# Patient Record
Sex: FEMALE | Race: WHITE | NOT HISPANIC OR LATINO
[De-identification: names, ages, dates, MRNs, and addresses within clinical notes are randomized per-mention and may not be internally consistent; named-entity substitution may affect disease eponyms.]

---

## 2019-01-14 PROBLEM — Z00.00 ENCOUNTER FOR PREVENTIVE HEALTH EXAMINATION: Status: ACTIVE | Noted: 2019-01-14

## 2019-03-08 ENCOUNTER — RECORD ABSTRACTING (OUTPATIENT)
Age: 74
End: 2019-03-08

## 2019-03-08 DIAGNOSIS — Z78.9 OTHER SPECIFIED HEALTH STATUS: ICD-10-CM

## 2019-03-08 DIAGNOSIS — Z87.39 PERSONAL HISTORY OF OTHER DISEASES OF THE MUSCULOSKELETAL SYSTEM AND CONNECTIVE TISSUE: ICD-10-CM

## 2019-04-05 ENCOUNTER — RECORD ABSTRACTING (OUTPATIENT)
Age: 74
End: 2019-04-05

## 2019-04-18 ENCOUNTER — RESULT REVIEW (OUTPATIENT)
Age: 74
End: 2019-04-18

## 2019-04-18 DIAGNOSIS — I10 ESSENTIAL (PRIMARY) HYPERTENSION: ICD-10-CM

## 2019-04-18 DIAGNOSIS — E78.00 PURE HYPERCHOLESTEROLEMIA, UNSPECIFIED: ICD-10-CM

## 2019-04-24 PROBLEM — Z82.49 FAMILY HISTORY OF CARDIOVASCULAR DISEASE: Status: ACTIVE | Noted: 2019-04-24

## 2019-04-26 ENCOUNTER — NON-APPOINTMENT (OUTPATIENT)
Age: 74
End: 2019-04-26

## 2019-04-26 ENCOUNTER — APPOINTMENT (OUTPATIENT)
Dept: CARDIOLOGY | Facility: CLINIC | Age: 74
End: 2019-04-26
Payer: MEDICARE

## 2019-04-26 VITALS
BODY MASS INDEX: 34.3 KG/M2 | HEART RATE: 69 BPM | WEIGHT: 159 LBS | DIASTOLIC BLOOD PRESSURE: 80 MMHG | SYSTOLIC BLOOD PRESSURE: 122 MMHG | HEIGHT: 57 IN

## 2019-04-26 DIAGNOSIS — Z82.49 FAMILY HISTORY OF ISCHEMIC HEART DISEASE AND OTHER DISEASES OF THE CIRCULATORY SYSTEM: ICD-10-CM

## 2019-04-26 PROCEDURE — 93000 ELECTROCARDIOGRAM COMPLETE: CPT

## 2019-04-26 PROCEDURE — 99214 OFFICE O/P EST MOD 30 MIN: CPT

## 2019-04-26 NOTE — HISTORY OF PRESENT ILLNESS
[FreeTextEntry1] : This 73-year-old female patient presents for cardiovascular evaluation.\par \par Her problem list is as noted above.\par \par Since her last examination one year ago she reports no major events or hospitalizations. She continues to no chronic shortness of breath. She is considerably deconditioned. No acute symptoms of shortness of breath, chest discomfort, lightheadedness, palpitations.\par \par She did have an upper respiratory infection in December. She saw her primary care physician.\par \par She has had laboratories that she kindly provided.

## 2019-04-26 NOTE — ASSESSMENT
[FreeTextEntry1] : EKG 4/26/19. Sinus rhythm. Low-voltage limb leads and precordial leads. No acute changes.

## 2019-04-26 NOTE — ADDENDUM
[FreeTextEntry1] : Instructions to staff:\par \par Send a copy of this report to the following provider(s):\par Dr. Marietta Lala\par \par Schedule followup:\par 1 year\par \par Schedule testing:\par The patient will call in advance of her appointment to schedule an echocardiogram\par \par \par This report was generated using Dragon Dictation. Please excuse obvious typographical errors and contact this office for any questions. Any preliminary copy of this note given to the patient at the time of this visit has not been proofread or edited. This note is a part of a shared electronic record used by our physicians and may contain information generated by other physicians in this practice in addition to your visit with this office.

## 2019-04-26 NOTE — REASON FOR VISIT
[FreeTextEntry1] : Ms. CHARLI SCHULTZ presents for cardiovascular evaluation.\par \par Her problem list includes:\par Chronic pericardial effusion, status post previous pericardiocentesis\par Hypertension\par Dyslipidemia\par Obesity.\par \par Her primary care physician is Dr. Marietta Lala

## 2019-04-26 NOTE — DISCUSSION/SUMMARY
[FreeTextEntry1] : Problem list/impression/recommendation.\par \par Pericardial effusion.\par Status post previous pericardial window with continuing moderate to large effusion without hemodynamic compromise. Chronic finding.\par Continue to follow clinically.\par \par Hypertension\par Good control. Continue current routine.\par \par Dyslipidemia.\par She is tolerating her every other day Livalo. She was intolerant of all of the statins.\par Satisfactory findings.\par \par Obesity.\par I encouraged the patient regarding exercise and diet. She does struggle with this. It does limit her functional capacity.

## 2019-04-26 NOTE — PHYSICAL EXAM
[Eyelids - No Xanthelasma] : the eyelids demonstrated no xanthelasmas [Normal Conjunctiva] : the conjunctiva exhibited no abnormalities [No Oral Pallor] : no oral pallor [Normal Oral Mucosa] : normal oral mucosa [No Oral Cyanosis] : no oral cyanosis [Normal Jugular Venous A Waves Present] : normal jugular venous A waves present [Normal Jugular Venous V Waves Present] : normal jugular venous V waves present [No Jugular Venous Escalante A Waves] : no jugular venous escalante A waves [Exaggerated Use Of Accessory Muscles For Inspiration] : no accessory muscle use [Respiration, Rhythm And Depth] : normal respiratory rhythm and effort [Auscultation Breath Sounds / Voice Sounds] : lungs were clear to auscultation bilaterally [Heart Rate And Rhythm] : heart rate and rhythm were normal [Murmurs] : no murmurs present [Heart Sounds] : normal S1 and S2 [Abdomen Tenderness] : non-tender [Abdomen Soft] : soft [Abdomen Mass (___ Cm)] : no abdominal mass palpated [] : no rash [Skin Color & Pigmentation] : normal skin color and pigmentation [No Venous Stasis] : no venous stasis [Skin Lesions] : no skin lesions [No Skin Ulcers] : no skin ulcer [Affect] : the affect was normal [Oriented To Time, Place, And Person] : oriented to person, place, and time [No Xanthoma] : no  xanthoma was observed [Mood] : the mood was normal [No Anxiety] : not feeling anxious [FreeTextEntry1] : trace edema

## 2020-06-14 ENCOUNTER — RESULT REVIEW (OUTPATIENT)
Age: 75
End: 2020-06-14

## 2020-07-20 ENCOUNTER — APPOINTMENT (OUTPATIENT)
Dept: ENDOCRINOLOGY | Facility: CLINIC | Age: 75
End: 2020-07-20
Payer: MEDICARE

## 2020-07-20 VITALS
DIASTOLIC BLOOD PRESSURE: 80 MMHG | HEIGHT: 57.75 IN | WEIGHT: 165 LBS | OXYGEN SATURATION: 95 % | HEART RATE: 77 BPM | SYSTOLIC BLOOD PRESSURE: 126 MMHG | BODY MASS INDEX: 34.63 KG/M2

## 2020-07-20 PROCEDURE — 99205 OFFICE O/P NEW HI 60 MIN: CPT

## 2020-07-20 NOTE — HISTORY OF PRESENT ILLNESS
[FreeTextEntry1] : Ms. CHARLI SCHULTZ is 74 year old who presents for osteoporosis evaluation. \par she  was diagnosed of osteoporosis on the basis of    DXA scan 8-10 yrs ago \par spine t scores are invalid , radius ulne -2.6, , hip is -1.1 ( increase in hip score , wrist is stable ) \par no fractures, but c/o back pain h/o DJD, and right hip pain and occasional left hip pain on prolonged inactivity \par Mother had osteoporosis \par She under went menopause at the age of 52 yrs \par she received any treatment for osteoporosis - none \par  h/o malignancies  -no \par  h/o radiation treatment -no \par  h/o clots -no \par h/o long-term steroids ( > 3 mths ) -no \par h/o phenytoin use , seizures  -no \par h/o eating disorders -no \par h/o prolonged amenorrhea -no \par Calcium intake \par Dietary -yes\par supplemental - 1 tab a day \par Vit D intake - 1000 u daily \par h/o renal stones -no \par h/o reflux disease -no , mild \par h/o malabsorption -no \par h/o falls or balance issues -no \par daily exercise - gardening \par \par 
left upper arm

## 2020-07-23 ENCOUNTER — RESULT REVIEW (OUTPATIENT)
Age: 75
End: 2020-07-23

## 2020-08-18 LAB
25(OH)D3 SERPL-MCNC: 45.9 NG/ML
ALBUMIN SERPL ELPH-MCNC: 4.5 G/DL
ALP BLD-CCNC: 82 U/L
ANION GAP SERPL CALC-SCNC: 13 MMOL/L
BUN SERPL-MCNC: 14 MG/DL
CALCIUM SERPL-MCNC: 9.6 MG/DL
CALCIUM SERPL-MCNC: 9.6 MG/DL
CHLORIDE SERPL-SCNC: 102 MMOL/L
CO2 SERPL-SCNC: 27 MMOL/L
COLLAGEN CTX SERPL-MCNC: 550 PG/ML
CREAT SERPL-MCNC: 0.7 MG/DL
GLUCOSE SERPL-MCNC: 102 MG/DL
M PROTEIN SPEC IFE-MCNC: NORMAL
PARATHYROID HORMONE INTACT: 42 PG/ML
PHOSPHATE SERPL-MCNC: 3.8 MG/DL
POTASSIUM SERPL-SCNC: 4.3 MMOL/L
SODIUM SERPL-SCNC: 142 MMOL/L

## 2020-08-26 ENCOUNTER — APPOINTMENT (OUTPATIENT)
Dept: CARDIOLOGY | Facility: CLINIC | Age: 75
End: 2020-08-26
Payer: MEDICARE

## 2020-08-26 VITALS
DIASTOLIC BLOOD PRESSURE: 80 MMHG | SYSTOLIC BLOOD PRESSURE: 124 MMHG | BODY MASS INDEX: 34.3 KG/M2 | WEIGHT: 159 LBS | HEIGHT: 57 IN

## 2020-08-26 VITALS — HEART RATE: 84 BPM

## 2020-08-26 PROCEDURE — 99214 OFFICE O/P EST MOD 30 MIN: CPT

## 2020-08-26 PROCEDURE — 93000 ELECTROCARDIOGRAM COMPLETE: CPT

## 2020-08-26 NOTE — HISTORY OF PRESENT ILLNESS
[FreeTextEntry1] : This 74 year-old female patient presents for cardiovascular evaluation.\par \par Her problem list is as noted above.\par \par Since her last examination 1 year ago she reports no major events or hospitalizations.  She tries to stay active but it is a struggle for her as well as maintaining her weight.  I encouraged her.\par \par There have been no acute symptoms of shortness of breath, chest discomfort, palpitation, lightheadedness.  She has some mild exercise intolerance but can do all her normal household activities.\par \par She did have an echocardiogram and laboratories as noted.  The echocardiogram shows a persistent but chronic and stable significant pericardial effusion despite previous pericardial window drainage.

## 2020-08-26 NOTE — REASON FOR VISIT
[FreeTextEntry1] : Ms. CHARLI SCHULTZ has the following problem list:\par \par \par Chronic pericardial effusion, status post previous pericardiocentesis\par Hypertension\par Dyslipidemia\par Obesity.\par Prediabetes\par \par Her primary care physician is Dr. Israel Felix

## 2020-08-26 NOTE — ASSESSMENT
[FreeTextEntry1] : EKG 8/26/2020.  Sinus rhythm.  Low voltage limb leads and precordial leads.  No acute changes.\par EKG 4/26/19. Sinus rhythm. Low-voltage limb leads and precordial leads. No acute changes.

## 2020-08-28 NOTE — DISCUSSION/SUMMARY
[FreeTextEntry1] : Brief recommendations and follow-up: (see above for details)\par \par The patient's overall cardiovascular status is stable.\par She continues with a chronic significant persistent pericardial effusion without hemodynamic compromise.  We will follow this regularly.\par I advised her to speak with her primary care physician regarding her prediabetes and her weight.  She may be a candidate for metformin.\par Next routine cardiology visit 1 year.\par The patient is aware to call a month or 2 in advance of her next year's visit so we can order an echocardiogram and have the results. 80.3

## 2020-09-02 ENCOUNTER — APPOINTMENT (OUTPATIENT)
Dept: ENDOCRINOLOGY | Facility: CLINIC | Age: 75
End: 2020-09-02
Payer: MEDICARE

## 2020-09-02 VITALS
HEART RATE: 74 BPM | SYSTOLIC BLOOD PRESSURE: 124 MMHG | BODY MASS INDEX: 34.95 KG/M2 | HEIGHT: 57 IN | DIASTOLIC BLOOD PRESSURE: 70 MMHG | OXYGEN SATURATION: 96 % | WEIGHT: 162 LBS

## 2020-09-02 PROCEDURE — 99214 OFFICE O/P EST MOD 30 MIN: CPT

## 2020-09-02 NOTE — ASSESSMENT
[Bisphosphonate Therapy] : Risks and benefits of bisphosphonate therapy were  discussed with the patient including gastroesophageal irritation, osteonecrosis of the jaw, and atypical femur fractures, and acute phase reaction

## 2020-09-02 NOTE — HISTORY OF PRESENT ILLNESS
[FreeTextEntry1] : Ms. CHARLI SCHULTZ is 74 year old who presents for osteoporosis evaluation. \par she  was diagnosed of osteoporosis on the basis of    DXA scan 8-10 yrs ago \par spine t scores are invalid , radius ulne -2.6, , hip is -1.1 ( increase in hip score , wrist is stable ) \par no fractures, but c/o back pain h/o DJD, and right hip pain and occasional left hip pain on prolonged inactivity \par Mother had osteoporosis \par She under went menopause at the age of 50 yrs \par she received any treatment for osteoporosis - none \par  h/o malignancies  -no \par  h/o radiation treatment -no \par  h/o clots -no \par h/o long-term steroids ( > 3 mths ) -no \par h/o phenytoin use , seizures  -no \par h/o eating disorders -no \par h/o prolonged amenorrhea -no \par Calcium intake \par Dietary -yes\par supplemental - 1 tab a day \par Vit D intake - 1000 u daily \par h/o renal stones -no \par h/o reflux disease -no , mild \par h/o malabsorption -no \par h/o falls or balance issues -no \par daily exercise - gardening \par \par \par \par 09/02/2020- FOLLOW UP\par  CHARLI SCHULTZ is here to discuss test results , labs and further plan of care \par labs discussed in detail-  essentially unremarkable \par no acute medical issues in the interim\par dicussed the imgaing findings of the hip xray \par blood work etc \par on calcium and vt D \par

## 2021-08-25 ENCOUNTER — NON-APPOINTMENT (OUTPATIENT)
Age: 76
End: 2021-08-25

## 2021-08-26 ENCOUNTER — NON-APPOINTMENT (OUTPATIENT)
Age: 76
End: 2021-08-26

## 2021-08-26 ENCOUNTER — APPOINTMENT (OUTPATIENT)
Dept: CARDIOLOGY | Facility: CLINIC | Age: 76
End: 2021-08-26
Payer: MEDICARE

## 2021-08-26 VITALS
WEIGHT: 156 LBS | SYSTOLIC BLOOD PRESSURE: 130 MMHG | HEIGHT: 57 IN | BODY MASS INDEX: 33.66 KG/M2 | HEART RATE: 67 BPM | TEMPERATURE: 97.8 F | DIASTOLIC BLOOD PRESSURE: 70 MMHG

## 2021-08-26 PROCEDURE — 93000 ELECTROCARDIOGRAM COMPLETE: CPT

## 2021-08-26 PROCEDURE — 99214 OFFICE O/P EST MOD 30 MIN: CPT

## 2021-08-26 NOTE — CARDIOLOGY SUMMARY
[de-identified] : Lexiscan MIBI 2/27/15. Normal. EF 70%. \par Stress test 1/23/2014. No ischemia. 6 minutes. Wiliam stage II. 7.2 METs. 96% predicted   maximum.\par  [de-identified] : Echo 8/5/2021.  A.  F.  Normal LV function.  EF 70%.  Large anterior and posterior pericardial effusion.  No hemodynamic compromise no change from 2020 study.\par Echo 6/16/2020. Normal LV function. EF 70%. Mild diastolic dysfunction. Normal valve  morphology. Trace TR. PA 18-23. Moderate to large anterior (up to 3.1 cm)   and posterior (up to 2.6 cm) chronic pericardial effusion. No hemodynamic compromise.    Mildly dilated proximal and ascending aorta, 4.0 cm. Similar to previous, possible mild   increase in pericardial effusion versus technical differences.\par  Echo 4/12/19. Normal LV function. EF 70%. Normal diastolic function. Normal valve  morphology. Moderate-large anterior, up to 2.5 cm, and posterior, up to 2.7 cm,  pericardial effusion (chronic finding). No hemodynamic compromise. Mildly dilated  proximal ascending aorta, 3.9 cm. Trace TR. Normal PA, 13-18.\par Echo 4/11/18. Normal LV function. EF 70%. Normal valve morphology. Trivial MR, TR.  Normal PA, and 19-24. Moderate-large anterior (2-2.8) and posterior (2-2.6) pericardial effusion. No compromise.\par

## 2021-08-26 NOTE — REVIEW OF SYSTEMS
[Negative] : Heme/Lymph [FreeTextEntry5] : See HPI. [FreeTextEntry7] : Improved GERD. [FreeTextEntry8] : Nocturia x1. [FreeTextEntry9] : Multijoint arthritis and chronic back syndrome.

## 2021-08-26 NOTE — ASSESSMENT
[FreeTextEntry1] : EKG 8/26/2021.  Sinus rhythm.  Low voltage limb and precordial leads.  No acute changes.\par EKG 8/26/2020.  Sinus rhythm.  Low voltage limb leads and precordial leads.  No acute changes.\par EKG 4/26/19. Sinus rhythm. Low-voltage limb leads and precordial leads. No acute changes.

## 2021-08-26 NOTE — HISTORY OF PRESENT ILLNESS
[FreeTextEntry1] : This 75 year-old female patient presents for cardiovascular evaluation.\par \par Her problem list is as noted above.\par \par Since her last examination 1 year ago she reports no major events or hospitalizations.  She is active at home and in the garden.  There have been no acute symptoms of shortness of breath, chest discomfort, palpitation, lightheadedness, fainting.  She continues to note some mild exercise intolerance with inclines that has been stable for many years.  No change.\par \par She has seen her primary care physician and had laboratories that were kindly provided.  She had her echocardiogram with the report as noted below.

## 2021-08-26 NOTE — DISCUSSION/SUMMARY
[FreeTextEntry1] : Brief recommendations and follow-up: (see above for details)\par \par The patient's overall cardiovascular status is well compensated.\par Stable large anterior and posterior pericardial effusion with prior history of pericardial window.\par Continue current medical regimen for hypertension, hyperlipidemia.\par Next routine cardiology visit 1 year.

## 2022-08-05 ENCOUNTER — NON-APPOINTMENT (OUTPATIENT)
Age: 77
End: 2022-08-05

## 2022-08-08 ENCOUNTER — APPOINTMENT (OUTPATIENT)
Dept: CARDIOLOGY | Facility: CLINIC | Age: 77
End: 2022-08-08

## 2022-08-08 PROCEDURE — 93306 TTE W/DOPPLER COMPLETE: CPT

## 2022-08-30 ENCOUNTER — NON-APPOINTMENT (OUTPATIENT)
Age: 77
End: 2022-08-30

## 2022-08-31 ENCOUNTER — APPOINTMENT (OUTPATIENT)
Dept: CARDIOLOGY | Facility: CLINIC | Age: 77
End: 2022-08-31

## 2022-08-31 ENCOUNTER — NON-APPOINTMENT (OUTPATIENT)
Age: 77
End: 2022-08-31

## 2022-08-31 VITALS
TEMPERATURE: 98.5 F | WEIGHT: 155 LBS | HEIGHT: 57 IN | BODY MASS INDEX: 33.44 KG/M2 | SYSTOLIC BLOOD PRESSURE: 114 MMHG | OXYGEN SATURATION: 99 % | HEART RATE: 77 BPM | DIASTOLIC BLOOD PRESSURE: 64 MMHG | RESPIRATION RATE: 14 BRPM

## 2022-08-31 PROCEDURE — 93000 ELECTROCARDIOGRAM COMPLETE: CPT

## 2022-08-31 PROCEDURE — 99214 OFFICE O/P EST MOD 30 MIN: CPT

## 2022-08-31 RX ORDER — LOSARTAN POTASSIUM 50 MG/1
50 TABLET, FILM COATED ORAL DAILY
Qty: 90 | Refills: 3 | Status: ACTIVE | COMMUNITY
Start: 2022-08-31

## 2022-08-31 RX ORDER — LEVOTHYROXINE SODIUM 0.03 MG/1
25 TABLET ORAL DAILY
Refills: 0 | Status: ACTIVE | COMMUNITY
Start: 2022-08-31

## 2022-08-31 RX ORDER — OMEGA-3 FATTY ACIDS/FISH OIL 360-1200MG
1200 CAPSULE ORAL
Refills: 0 | Status: ACTIVE | COMMUNITY
Start: 2022-08-31

## 2022-08-31 RX ORDER — CHOLECALCIFEROL (VITAMIN D3) 25 MCG
25 MCG TABLET ORAL
Refills: 0 | Status: ACTIVE | COMMUNITY

## 2022-08-31 RX ORDER — QUINAPRIL AND HYDROCHLOROTHIAZIDE 12.5; 2 MG/1; MG/1
20-12.5 TABLET ORAL DAILY
Qty: 90 | Refills: 3 | Status: DISCONTINUED | COMMUNITY
End: 2022-08-31

## 2022-08-31 NOTE — DISCUSSION/SUMMARY
[FreeTextEntry1] : Brief recommendations and follow-up: (see above for details)\par \par The patient's overall cardiovascular status is stable.\par As noted, large, chronic anterior and posterior pericardial effusion without hemodynamic compromise.  This has existed despite prior pericardial window.  She is clinically stable.\par The patient will inquire regarding potential metformin therapy with her primary care physician.\par Medications reviewed and reconciled.\par Next routine cardiology visit 1 year. yes

## 2022-08-31 NOTE — HISTORY OF PRESENT ILLNESS
[FreeTextEntry1] : This 77 year-old female patient presents for cardiovascular evaluation.\par \par Her problem list is as noted above.\par \par Since her last examination 1 year ago she reports no major events or hospitalizations.  She tries to stay active particularly in the garden but she does have significant orthopedic limitations.\par \par She had an echocardiogram as planned which continues to show a significant anterior and posterior pericardial effusion.  This has been chronic despite prior drainage.  No hemodynamic compromise.\par \par She has had follow-up with her primary care physician and had laboratories that she kindly provided.\par \par There have been no acute symptoms of chest discomfort, shortness of breath, palpitation, lightheadedness, fainting.

## 2022-08-31 NOTE — ASSESSMENT
[FreeTextEntry1] : EKG 8/31/2022.  Sinus rhythm.  VPC.  Low voltage limb and precordial leads.\par EKG 8/26/2021.  Sinus rhythm.  Low voltage limb and precordial leads.  No acute changes.\par EKG 8/26/2020.  Sinus rhythm.  Low voltage limb leads and precordial leads.  No acute changes.\par EKG 4/26/19. Sinus rhythm. Low-voltage limb leads and precordial leads. No acute changes.

## 2022-08-31 NOTE — REVIEW OF SYSTEMS
[FreeTextEntry5] : See HPI. [FreeTextEntry7] : Improved GERD. [FreeTextEntry8] : Nocturia x1. [FreeTextEntry9] : Multijoint arthritis and chronic back syndrome.

## 2022-08-31 NOTE — CARDIOLOGY SUMMARY
[de-identified] : Lexiscan MIBI 2/27/15. Normal. EF 70%. \par Stress test 1/23/2014. No ischemia. 6 minutes. Wiliam stage II. 7.2 METs. 96% predicted   maximum.\par  [de-identified] : Echo 8/8/22.  Normal LV function.  EF 70%.  Mild diastolic dysfunction.  Normal valve morphology and Doppler exam.  Large anterior (2.8 cm) and posterior (2.0 cm) pericardial effusion (known, chronic finding).  No evidence of hemodynamic compromise.  Normal estimated PA pressure, 16 mmHg.  Dilated ascending aorta, 4.0 cm.  Compared with 8/5/2021, no significant change.\par \par Echo 8/5/2021.  A.  F.  Normal LV function.  EF 70%.  Large anterior and posterior pericardial effusion.  No hemodynamic compromise no change from 2020 study.\par Echo 6/16/2020. Normal LV function. EF 70%. Mild diastolic dysfunction. Normal valve  morphology. Trace TR. PA 18-23. Moderate to large anterior (up to 3.1 cm)   and posterior (up to 2.6 cm) chronic pericardial effusion. No hemodynamic compromise.    Mildly dilated proximal and ascending aorta, 4.0 cm. Similar to previous, possible mild   increase in pericardial effusion versus technical differences.\par  Echo 4/12/19. Normal LV function. EF 70%. Normal diastolic function. Normal valve  morphology. Moderate-large anterior, up to 2.5 cm, and posterior, up to 2.7 cm,  pericardial effusion (chronic finding). No hemodynamic compromise. Mildly dilated  proximal ascending aorta, 3.9 cm. Trace TR. Normal PA, 13-18.\par Echo 4/11/18. Normal LV function. EF 70%. Normal valve morphology. Trivial MR, TR.  Normal PA, and 19-24. Moderate-large anterior (2-2.8) and posterior (2-2.6) pericardial effusion. No compromise.\par

## 2023-04-18 PROBLEM — Z00.00 ENCOUNTER FOR PREVENTIVE HEALTH EXAMINATION: Noted: 2023-04-18

## 2023-04-26 ENCOUNTER — APPOINTMENT (OUTPATIENT)
Dept: ORTHOPEDIC SURGERY | Facility: CLINIC | Age: 78
End: 2023-04-26

## 2023-07-11 ENCOUNTER — NON-APPOINTMENT (OUTPATIENT)
Age: 78
End: 2023-07-11

## 2023-07-11 ENCOUNTER — APPOINTMENT (OUTPATIENT)
Dept: ENDOCRINOLOGY | Facility: CLINIC | Age: 78
End: 2023-07-11
Payer: MEDICARE

## 2023-07-11 VITALS
BODY MASS INDEX: 32.36 KG/M2 | SYSTOLIC BLOOD PRESSURE: 128 MMHG | WEIGHT: 150 LBS | HEART RATE: 72 BPM | OXYGEN SATURATION: 99 % | DIASTOLIC BLOOD PRESSURE: 80 MMHG | HEIGHT: 57 IN

## 2023-07-11 DIAGNOSIS — M25.569 PAIN IN UNSPECIFIED KNEE: ICD-10-CM

## 2023-07-11 PROCEDURE — 99215 OFFICE O/P EST HI 40 MIN: CPT

## 2023-07-11 RX ORDER — ASPIRIN 81 MG
81 TABLET, DELAYED RELEASE (ENTERIC COATED) ORAL
Refills: 0 | Status: ACTIVE | COMMUNITY

## 2023-07-11 NOTE — HISTORY OF PRESENT ILLNESS
[FreeTextEntry1] : Ms. CHARLI SCHULTZ is 74 year old who presents for osteoporosis evaluation. \par she  was diagnosed of osteoporosis on the basis of    DXA scan 8-10 yrs ago \par spine t scores are invalid , radius ulne -2.6, , hip is -1.1 ( increase in hip score , wrist is stable ) \par no fractures, but c/o back pain h/o DJD, and right hip pain and occasional left hip pain on prolonged inactivity \par Mother had osteoporosis \par She under went menopause at the age of 50 yrs \par she received any treatment for osteoporosis - none \par  h/o malignancies  -no \par  h/o radiation treatment -no \par  h/o clots -no \par h/o long-term steroids ( > 3 mths ) -no \par h/o phenytoin use , seizures  -no \par h/o eating disorders -no \par h/o prolonged amenorrhea -no \par Calcium intake \par Dietary -yes\par supplemental - 1 tab a day \par Vit D intake - 1000 u daily \par h/o renal stones -no \par h/o reflux disease -no , mild \par h/o malabsorption -no \par h/o falls or balance issues -no \par daily exercise - gardening \par \par \par \par 09/02/2020- FOLLOW UP\par  CHARLI SCHULTZ is here to discuss test results , labs and further plan of care \par labs discussed in detail-  essentially unremarkable \par no acute medical issues in the interim\par dicussed the imgaing findings of the hip xray \par blood work etc \par on calcium and vt D \par \par \par 07/11/2023- FOLLOW UP\par Patient presents for follow-up after almost 3 years.  She was diagnosed with stress fractures in any history of multiple steroid shots for knee pain and osteoarthritis.  She was also diagnosed with osteoporosis per DEXA scan on the most recent bone density.  She did not she is to take alendronate as advised on prior visit.  She takes 1000 international units of vitamin D.  Stress fractures likely related to osteoporosis worsened with steroid use.  At this point I would refer her to pain management as it is causing significant issues with her quality of life.  In addition.  I will start the work-up for osteoporosis treatment.  I would prefer her to be treated with Forteo as it might help with fracture healing as well.  Important to rule out any evidence of primary hyperparathyroidism or any calcium metabolic disorder.  No history of cancer treatment or radiation treatment in the past.  All questions and concerns addressed.  Imaging reviewed.  Follow-up in 4 to 6 months.  Advised to start 1 Viactiv pill a day

## 2023-07-11 NOTE — ASSESSMENT
[0] : 2) Feeling down, depressed, or hopeless: Not at all (0) [PHQ-2 Negative - No further assessment needed] : PHQ-2 Negative - No further assessment needed [Teriparatide/Abaloparatide Therapy] : Risks and benefits of Teriparatide/Abaloparatide therapy were discussed with the patient including potential risk of osteosarcoma [[id number]] : 0

## 2023-07-19 LAB
24R-OH-CALCIDIOL SERPL-MCNC: 65.7 PG/ML
25(OH)D3 SERPL-MCNC: 40.8 NG/ML
ALBUMIN SERPL ELPH-MCNC: 4.5 G/DL
ANION GAP SERPL CALC-SCNC: 13 MMOL/L
BUN SERPL-MCNC: 14 MG/DL
CALCIUM ?TM UR-MCNC: 6.7 MG/DL
CALCIUM SERPL-MCNC: 9.8 MG/DL
CALCIUM SERPL-MCNC: 9.8 MG/DL
CALCIUM/CREAT UR: 0.2 RATIO
CHLORIDE SERPL-SCNC: 104 MMOL/L
CO2 SERPL-SCNC: 26 MMOL/L
CREAT SERPL-MCNC: 0.61 MG/DL
CREAT SPEC-SCNC: 31 MG/DL
EGFR: 93 ML/MIN/1.73M2
GLUCOSE SERPL-MCNC: 111 MG/DL
M PROTEIN SPEC IFE-MCNC: NORMAL
PARATHYROID HORMONE INTACT: 40 PG/ML
PHOSPHATE SERPL-MCNC: 3.2 MG/DL
POTASSIUM SERPL-SCNC: 4.7 MMOL/L
SODIUM SERPL-SCNC: 144 MMOL/L
TSH SERPL-ACNC: 3.03 UIU/ML

## 2023-07-21 LAB
ALP BONE SERPL-MCNC: 20.6 UG/L
COLLAGEN CTX SERPL-MCNC: 464 PG/ML

## 2023-08-18 ENCOUNTER — APPOINTMENT (OUTPATIENT)
Dept: CARDIOLOGY | Facility: CLINIC | Age: 78
End: 2023-08-18
Payer: MEDICARE

## 2023-08-18 PROCEDURE — 93306 TTE W/DOPPLER COMPLETE: CPT

## 2023-09-25 ENCOUNTER — NON-APPOINTMENT (OUTPATIENT)
Age: 78
End: 2023-09-25

## 2023-09-26 ENCOUNTER — NON-APPOINTMENT (OUTPATIENT)
Age: 78
End: 2023-09-26

## 2023-09-26 ENCOUNTER — APPOINTMENT (OUTPATIENT)
Dept: CARDIOLOGY | Facility: CLINIC | Age: 78
End: 2023-09-26
Payer: MEDICARE

## 2023-09-26 VITALS
DIASTOLIC BLOOD PRESSURE: 68 MMHG | BODY MASS INDEX: 32.36 KG/M2 | HEIGHT: 57 IN | SYSTOLIC BLOOD PRESSURE: 126 MMHG | HEART RATE: 67 BPM | WEIGHT: 150 LBS

## 2023-09-26 VITALS
BODY MASS INDEX: 32.36 KG/M2 | HEIGHT: 57 IN | WEIGHT: 150 LBS | SYSTOLIC BLOOD PRESSURE: 126 MMHG | HEART RATE: 72 BPM | DIASTOLIC BLOOD PRESSURE: 68 MMHG | OXYGEN SATURATION: 97 %

## 2023-09-26 DIAGNOSIS — I31.39 OTHER PERICARDIAL EFFUSION (NONINFLAMMATORY): ICD-10-CM

## 2023-09-26 DIAGNOSIS — E78.5 HYPERLIPIDEMIA, UNSPECIFIED: ICD-10-CM

## 2023-09-26 DIAGNOSIS — E66.9 OBESITY, UNSPECIFIED: ICD-10-CM

## 2023-09-26 DIAGNOSIS — R73.03 PREDIABETES.: ICD-10-CM

## 2023-09-26 DIAGNOSIS — Z92.89 PERSONAL HISTORY OF OTHER MEDICAL TREATMENT: ICD-10-CM

## 2023-09-26 DIAGNOSIS — I77.810 THORACIC AORTIC ECTASIA: ICD-10-CM

## 2023-09-26 DIAGNOSIS — I10 ESSENTIAL (PRIMARY) HYPERTENSION: ICD-10-CM

## 2023-09-26 DIAGNOSIS — Z01.89 ENCOUNTER FOR OTHER SPECIFIED SPECIAL EXAMINATIONS: ICD-10-CM

## 2023-09-26 PROCEDURE — 93000 ELECTROCARDIOGRAM COMPLETE: CPT

## 2023-09-26 PROCEDURE — 99214 OFFICE O/P EST MOD 30 MIN: CPT | Mod: 25

## 2023-09-26 RX ORDER — HYDROCORTISONE ACETATE 0.5 %
CREAM (GRAM) TOPICAL
Refills: 0 | Status: DISCONTINUED | COMMUNITY
End: 2023-09-26

## 2023-10-10 ENCOUNTER — APPOINTMENT (OUTPATIENT)
Dept: PAIN MANAGEMENT | Facility: CLINIC | Age: 78
End: 2023-10-10

## 2023-10-19 ENCOUNTER — APPOINTMENT (OUTPATIENT)
Dept: PAIN MANAGEMENT | Facility: CLINIC | Age: 78
End: 2023-10-19
Payer: MEDICARE

## 2023-10-19 VITALS
HEIGHT: 57 IN | WEIGHT: 150 LBS | SYSTOLIC BLOOD PRESSURE: 184 MMHG | BODY MASS INDEX: 32.36 KG/M2 | DIASTOLIC BLOOD PRESSURE: 102 MMHG

## 2023-10-19 VITALS — DIASTOLIC BLOOD PRESSURE: 79 MMHG | SYSTOLIC BLOOD PRESSURE: 161 MMHG

## 2023-10-19 DIAGNOSIS — G89.4 CHRONIC PAIN SYNDROME: ICD-10-CM

## 2023-10-19 PROCEDURE — 99204 OFFICE O/P NEW MOD 45 MIN: CPT

## 2023-11-09 ENCOUNTER — APPOINTMENT (OUTPATIENT)
Dept: BREAST CENTER | Facility: CLINIC | Age: 78
End: 2023-11-09
Payer: MEDICARE

## 2023-11-09 ENCOUNTER — NON-APPOINTMENT (OUTPATIENT)
Age: 78
End: 2023-11-09

## 2023-11-09 VITALS
BODY MASS INDEX: 26.5 KG/M2 | OXYGEN SATURATION: 96 % | DIASTOLIC BLOOD PRESSURE: 77 MMHG | HEART RATE: 85 BPM | SYSTOLIC BLOOD PRESSURE: 128 MMHG | WEIGHT: 144 LBS | HEIGHT: 62 IN

## 2023-11-09 PROCEDURE — 99204 OFFICE O/P NEW MOD 45 MIN: CPT

## 2023-11-16 ENCOUNTER — RX RENEWAL (OUTPATIENT)
Age: 78
End: 2023-11-16

## 2023-11-28 ENCOUNTER — RESULT REVIEW (OUTPATIENT)
Age: 78
End: 2023-11-28

## 2023-11-29 ENCOUNTER — RESULT REVIEW (OUTPATIENT)
Age: 78
End: 2023-11-29

## 2023-11-30 ENCOUNTER — APPOINTMENT (OUTPATIENT)
Dept: BREAST CENTER | Facility: HOSPITAL | Age: 78
End: 2023-11-30

## 2023-11-30 ENCOUNTER — RESULT REVIEW (OUTPATIENT)
Age: 78
End: 2023-11-30

## 2023-12-05 ENCOUNTER — NON-APPOINTMENT (OUTPATIENT)
Age: 78
End: 2023-12-05

## 2023-12-11 ENCOUNTER — APPOINTMENT (OUTPATIENT)
Dept: BREAST CENTER | Facility: CLINIC | Age: 78
End: 2023-12-11
Payer: MEDICARE

## 2023-12-11 PROCEDURE — 99024 POSTOP FOLLOW-UP VISIT: CPT

## 2023-12-28 ENCOUNTER — NON-APPOINTMENT (OUTPATIENT)
Age: 78
End: 2023-12-28

## 2024-01-10 ENCOUNTER — APPOINTMENT (OUTPATIENT)
Dept: RADIATION ONCOLOGY | Facility: CLINIC | Age: 79
End: 2024-01-10
Payer: MEDICARE

## 2024-01-10 ENCOUNTER — RESULT REVIEW (OUTPATIENT)
Age: 79
End: 2024-01-10

## 2024-01-10 ENCOUNTER — APPOINTMENT (OUTPATIENT)
Dept: HEMATOLOGY ONCOLOGY | Facility: CLINIC | Age: 79
End: 2024-01-10
Payer: MEDICARE

## 2024-01-10 VITALS
WEIGHT: 152.5 LBS | RESPIRATION RATE: 17 BRPM | HEART RATE: 72 BPM | HEIGHT: 62 IN | BODY MASS INDEX: 28.06 KG/M2 | OXYGEN SATURATION: 97 % | DIASTOLIC BLOOD PRESSURE: 68 MMHG | TEMPERATURE: 96.1 F | SYSTOLIC BLOOD PRESSURE: 147 MMHG

## 2024-01-10 VITALS
OXYGEN SATURATION: 98 % | SYSTOLIC BLOOD PRESSURE: 174 MMHG | RESPIRATION RATE: 18 BRPM | DIASTOLIC BLOOD PRESSURE: 88 MMHG | HEART RATE: 66 BPM

## 2024-01-10 PROCEDURE — 36415 COLL VENOUS BLD VENIPUNCTURE: CPT

## 2024-01-10 PROCEDURE — 99205 OFFICE O/P NEW HI 60 MIN: CPT

## 2024-01-10 PROCEDURE — 99205 OFFICE O/P NEW HI 60 MIN: CPT | Mod: 25

## 2024-01-10 NOTE — PHYSICAL EXAM
[Fully active, able to carry on all pre-disease performance without restriction] : Status 0 - Fully active, able to carry on all pre-disease performance without restriction [Normal] : affect appropriate [de-identified] : L breast incision - c/d/i. No signs of infection. No masses palpated bilaterally, no axillary adenopathy

## 2024-01-10 NOTE — CONSULT LETTER
[Dear  ___] : Dear  [unfilled], [Consult Letter:] : I had the pleasure of evaluating your patient, [unfilled]. [Please see my note below.] : Please see my note below. [Consult Closing:] : Thank you very much for allowing me to participate in the care of this patient.  If you have any questions, please do not hesitate to contact me. [Sincerely,] : Sincerely, [FreeTextEntry3] : Yane Jimenez DO, FACLORAINE, FACP Medical Oncology and Hematology [facility]

## 2024-01-10 NOTE — HISTORY OF PRESENT ILLNESS
[de-identified] : Ms. Frost is a 78-year-old postmenopausal that presents for initial consultation referred by Dr. Khan for newly diagnosed breast cancer.   Oncological History:   Had benign breast bx at age 57 or R breast   s/p screening mammogram which showed in the left outer central breast and focal asymmetry and distortion.   Follow-up left ultrasound showed at the 3 o'clock position of 1.1 cm mass with normal-appearing lymph nodes.   s/p left ultrasound-guided core biopsy of the mass which was at 3:00 retroareolar region and showed a moderately differentiated invasive ductal carcinoma that is ER/DC positive HER2 negative with a Ki-67 of 40 to 50%.    left partial mastectomy with sentinel node biopsy Moderately differentiated invasive ductal carcinoma, 12 mm, ER/DC positive, HER2 negative, Ki-67 41%, 0/2 nodes [id number]  Mammaprint low risk   Breast Cancer Risk Factors: Menarche: 14 Date of LMP: 57 Menopause: 57  Age at first live birth: 25 Nursed: yes Hysterectomy: no Oophorectomy: no OCP: no HRT: no Last pap/pelvic exam: has not seen GYN in many years Related family history no cancer hx  Ashkenazi: no BRCA testing:no  Cardio Dr. Cosme s/p pericardiocentesis  Endo Dr. [facility] reclast

## 2024-01-10 NOTE — ASSESSMENT
[FreeTextEntry1] : # Moderately differentiated invasive ductal carcinoma, 12 mm, ER/SC positive, HER2 negative, Ki-67 41%, 0/2 nodes [id number] s/p left partial mastectomy with sentinel node biopsy Mammaprint low risk  Reviewed the diagnosis, prognosis and natural history of ER/SC+, HER2- IDC Reviewed the imaging and path Reviewed the NCCN guidelines and patient expresses understanding Due to low risk mammaprint does not need chemo She will discuss with Dr. Deshpande the role of RT after our consultation She has osteoporosis of the forearm -3.2 and significantKnee pain that is affecting her quality of life I discussed AI however in her case she may benefit from Tamoxifen instead. We reviewed the side effects of tamoxifen to include but are not limited to increased risk of uterine cancer, increased risk of VTE, hot flashes, decreased labido, vaginal dryness, mood swings Will need repeat DEXA 8/24.  #osteoporosis given reclast 9/2023 continue ca++ and vit D continue weight bearing exercise limit alcohol maintain healthy BMI  RTC in 3 months with cbc with diff, cmp, vit D

## 2024-01-10 NOTE — VITALS
[Maximal Pain Intensity: 0/10] : 0/10 [Least Pain Intensity: 0/10] : 0/10 [100: Normal, no complaints, no evidence of disease.] : 100: Normal, no complaints, no evidence of disease. [ECOG Performance Status: 0 - Fully active, able to carry on all pre-disease performance without restriction] : Performance Status: 0 - Fully active, able to carry on all pre-disease performance without restriction [Date: ____________] : Patient's last distress assessment performed on [unfilled]. [3 - Distress Level] : Distress Level: 3

## 2024-01-15 NOTE — DISEASE MANAGEMENT
[Clinical] : TNM Stage: c [IA] : IA [Pathological] : TNM Stage: p [FreeTextEntry4] : Invasive Ductal Carcinoma of the left breast, ER/MS+ [NTNM] : 0 [TTNM] : 1c [MTNM] : X

## 2024-01-15 NOTE — HISTORY OF PRESENT ILLNESS
[FreeTextEntry1] : Mrs. Garcia is a 78-year-old female, diagnosed with stage IA, [id number], ER/GA+ invasive ductal carcinoma of the left breast, s/p lumpectomy referred for a consultation to discuss adjuvant radiation therapy.   Mrs. Garcia underwent a screening mammogram (23) which demonstrated asymmetry and distortion in the left breast. Follow-up diagnostic mammogram and US (10/20/23) revealed highly suspicious mass in the left 3:00 retroareolar region. On 11/3/23, she underwent a left ultrasound-guided core biopsy of the mass and pathology revealed a moderately differentiated invasive ductal carcinoma measuring 11 mm, grade 2, ER/GA positive HER2 (equivocal) negative with a Ki-67 of 41 to 50%.   On 23, she underwent a left partial mastectomy and sentinel lymph node sampling performed by Dr. Khan. Surgical pathology revealed a 12 mm invasive carcinoma, grade 2, DCIS present - intermediate grade; all surgical margins were negative - closest was 1.25 mm to the medial and at least 1.5 mm to the lateral margins. 0 / 2 lymph nodes were positive. Biomarkers were ER/GA positive, HER2 equivocal - negative, and Ki-67 41%. Ms. Frost tolerated the procedure well.  MammaPrint low risk luminal - type A  Patient has no family history of cancer.  GYN HX; , menarche 15, menopause at 57. No HRT or BC use.   Ms. Frost is present today to discuss adjuvant radiation therapy.

## 2024-01-15 NOTE — LETTER CLOSING
[Consult Closing:] : Thank you for allowing me to participate in the care of this patient.  If you have any questions, please do not hesitate to contact me. [Sincerely yours,] : Sincerely yours, [FreeTextEntry3] : Haylie Deshpande MD

## 2024-01-24 ENCOUNTER — TRANSCRIPTION ENCOUNTER (OUTPATIENT)
Age: 79
End: 2024-01-24

## 2024-02-06 ENCOUNTER — NON-APPOINTMENT (OUTPATIENT)
Age: 79
End: 2024-02-06

## 2024-02-07 NOTE — HISTORY OF PRESENT ILLNESS
[FreeTextEntry1] : Ms. Hoyos is status post fraction 4 / 5 of radiation to the left breast. She reports she is doing well without any significant side effects. She has very mild erythema to the treated area. She denies any pruritus or discomfort. She denies any fatigue at this time. She is scheduled to complete treatment tomorrow, 2/7/24. She is tolerating treatment well and will follow-up in 1 month for her PTE.

## 2024-02-07 NOTE — DISEASE MANAGEMENT
[Pathological] : TNM Stage: p [IA] : IA [FreeTextEntry4] : Invasive Ductal Carcinoma of the left breast, ER/[place]+ [TTNM] : 1c [NTNM] : 0 [MTNM] : X [de-identified] : 5671 cGy [de-identified] : 2600 cGy [de-identified] : left breast

## 2024-03-05 RX ORDER — PITAVASTATIN CALCIUM 2 MG/1
2 TABLET ORAL
Qty: 90 | Refills: 3 | Status: ACTIVE | COMMUNITY
Start: 1900-01-01 | End: 1900-01-01

## 2024-03-22 ENCOUNTER — APPOINTMENT (OUTPATIENT)
Dept: RADIATION ONCOLOGY | Facility: CLINIC | Age: 79
End: 2024-03-22
Payer: MEDICARE

## 2024-03-22 DIAGNOSIS — Z17.0 MALIGNANT NEOPLASM OF CENTRAL PORTION OF LEFT FEMALE BREAST: ICD-10-CM

## 2024-03-22 DIAGNOSIS — C50.112 MALIGNANT NEOPLASM OF CENTRAL PORTION OF LEFT FEMALE BREAST: ICD-10-CM

## 2024-03-22 PROCEDURE — 99024 POSTOP FOLLOW-UP VISIT: CPT

## 2024-04-11 RX ORDER — TAMOXIFEN CITRATE 20 MG/1
20 TABLET, FILM COATED ORAL DAILY
Qty: 90 | Refills: 1 | Status: ACTIVE | COMMUNITY
Start: 2024-01-10 | End: 1900-01-01

## 2024-04-22 ENCOUNTER — APPOINTMENT (OUTPATIENT)
Dept: BREAST CENTER | Facility: CLINIC | Age: 79
End: 2024-04-22
Payer: MEDICARE

## 2024-04-22 ENCOUNTER — RESULT REVIEW (OUTPATIENT)
Age: 79
End: 2024-04-22

## 2024-04-22 ENCOUNTER — APPOINTMENT (OUTPATIENT)
Dept: HEMATOLOGY ONCOLOGY | Facility: CLINIC | Age: 79
End: 2024-04-22
Payer: MEDICARE

## 2024-04-22 VITALS
WEIGHT: 153 LBS | HEIGHT: 62 IN | TEMPERATURE: 96.9 F | OXYGEN SATURATION: 96 % | DIASTOLIC BLOOD PRESSURE: 78 MMHG | BODY MASS INDEX: 28.16 KG/M2 | RESPIRATION RATE: 16 BRPM | HEART RATE: 60 BPM | SYSTOLIC BLOOD PRESSURE: 144 MMHG

## 2024-04-22 VITALS
OXYGEN SATURATION: 97 % | WEIGHT: 156 LBS | SYSTOLIC BLOOD PRESSURE: 127 MMHG | HEIGHT: 62 IN | HEART RATE: 69 BPM | BODY MASS INDEX: 28.71 KG/M2 | DIASTOLIC BLOOD PRESSURE: 85 MMHG

## 2024-04-22 DIAGNOSIS — Z90.12 ACQUIRED ABSENCE OF LEFT BREAST AND NIPPLE: ICD-10-CM

## 2024-04-22 DIAGNOSIS — Z85.3 PERSONAL HISTORY OF MALIGNANT NEOPLASM OF BREAST: ICD-10-CM

## 2024-04-22 PROCEDURE — 99213 OFFICE O/P EST LOW 20 MIN: CPT

## 2024-04-22 PROCEDURE — 36415 COLL VENOUS BLD VENIPUNCTURE: CPT

## 2024-04-22 PROCEDURE — 99214 OFFICE O/P EST MOD 30 MIN: CPT

## 2024-04-22 NOTE — HISTORY OF PRESENT ILLNESS
[de-identified] : Ms. Frost is a 78-year-old postmenopausal that presents for initial consultation referred by Dr. Khan for newly diagnosed breast cancer.   Oncological History:   Had benign breast bx at age 57 or R breast   s/p screening mammogram which showed in the left outer central breast and focal asymmetry and distortion.   Follow-up left ultrasound showed at the 3 o'clock position of 1.1 cm mass with normal-appearing lymph nodes.   s/p left ultrasound-guided core biopsy of the mass which was at 3:00 retroareolar region and showed a moderately differentiated invasive ductal carcinoma that is ER/PA positive HER2 negative with a Ki-67 of 40 to 50%.    left partial mastectomy with sentinel node biopsy Moderately differentiated invasive ductal carcinoma, 12 mm, ER/PA positive, HER2 negative, Ki-67 41%, 0/2 nodes [id number]  Mammaprint low risk   Breast Cancer Risk Factors: Menarche: 14 Date of LMP: 57 Menopause: 57  Age at first live birth: 25 Nursed: yes Hysterectomy: no Oophorectomy: no OCP: no HRT: no Last pap/pelvic exam: has not seen GYN in many years Related family history no cancer hx  Ashkenazi: no BRCA testing:no  Cardio Dr. Cosme s/p pericardiocentesis  Endo Dr. Pretty [facility] reclast

## 2024-04-22 NOTE — ASSESSMENT
[FreeTextEntry1] : # Moderately differentiated invasive ductal carcinoma, 12 mm, ER/VT positive, HER2 negative, Ki-67 41%, 0/2 nodes [id number] s/p left partial mastectomy with sentinel node biopsy Mammaprint low risk  Due to low risk mammaprint does not need chemo s/p RT She has osteoporosis of the forearm -3.2 and significant Knee pain that is affecting her quality of life I discussed AI however in her case she may benefit from Tamoxifen instead. 4/22/24 - recommend continuing tamoxifen. vs and labs reviewed. cbc and cmet wnl. pending vit D. continue ca++ and vit D.  Will need repeat DEXA 8/24.  #osteoporosis given reclast 9/2023 continue ca++ and vit D continue weight bearing exercise limit alcohol maintain healthy BMI  RTC in 4-6 months with cbc with diff, cmp, vit D

## 2024-04-22 NOTE — CONSULT LETTER
[Dear  ___] : Dear  [unfilled], [Consult Letter:] : I had the pleasure of evaluating your patient, [unfilled]. [Please see my note below.] : Please see my note below. [Consult Closing:] : Thank you very much for allowing me to participate in the care of this patient.  If you have any questions, please do not hesitate to contact me. [Sincerely,] : Sincerely, [FreeTextEntry3] : Yane Jimenez DO, FACLORAINE, FACP Medical Oncology and Hematology [facility]

## 2024-04-22 NOTE — PHYSICAL EXAM
[Fully active, able to carry on all pre-disease performance without restriction] : Status 0 - Fully active, able to carry on all pre-disease performance without restriction [Normal] : affect appropriate [de-identified] : L breast incision - c/d/i. No signs of infection. No masses palpated bilaterally, no axillary adenopathy

## 2024-04-23 PROBLEM — Z90.12 STATUS POST PARTIAL MASTECTOMY OF LEFT BREAST: Status: ACTIVE | Noted: 2024-04-23

## 2024-04-23 PROBLEM — Z85.3 PERSONAL HISTORY OF BREAST CANCER: Status: ACTIVE | Noted: 2024-04-16

## 2024-04-23 NOTE — HISTORY OF PRESENT ILLNESS
[FreeTextEntry1] : 11/23 left partial mastectomy with sentinel node biopsy Moderately differentiated invasive ductal carcinoma, 12 mm, ER/[place] positive, HER2 negative, Ki-67 41%, 0/2 nodes [id number], Stage 1; low risk luminal MammaPrint: F LE X trial Whole breast radiation, tamoxifen  Patient denies any breast masses or bone pain.  78-year-old postmenopausal woman had a screening mammogram which showed in the left outer central breast and focal asymmetry and distortion.  Follow-up left ultrasound showed at the 3 o'clock position of 1.1 cm mass with normal-appearing lymph nodes.  Patient underwent a left ultrasound-guided core biopsy of the mass which was at 3:00 retroareolar region and showed a moderately differentiated invasive ductal carcinoma that is ER/WI positive HER2 negative with a Ki-67 of 40 to 50%.  Patient denies any breast masses or bone pain.  Patient has no family history of breast cancer and this is the patient's second biopsy the her first being at age 57 that was a benign right core biopsy.  Patient has never taken hormone replacement therapy.  Patient has a clinical stage Ia breast cancer that is T1 cN0 M0.

## 2024-04-23 NOTE — REASON FOR VISIT
[Follow-Up: _____] : a [unfilled] follow-up visit [FreeTextEntry1] : Left partial mastectomy and sentinel node biopsy

## 2024-05-21 ENCOUNTER — APPOINTMENT (OUTPATIENT)
Dept: ENDOCRINOLOGY | Facility: CLINIC | Age: 79
End: 2024-05-21
Payer: MEDICARE

## 2024-05-21 VITALS
OXYGEN SATURATION: 96 % | DIASTOLIC BLOOD PRESSURE: 60 MMHG | SYSTOLIC BLOOD PRESSURE: 122 MMHG | BODY MASS INDEX: 29.84 KG/M2 | HEART RATE: 78 BPM | HEIGHT: 60 IN | WEIGHT: 152 LBS

## 2024-05-21 DIAGNOSIS — M17.0 BILATERAL PRIMARY OSTEOARTHRITIS OF KNEE: ICD-10-CM

## 2024-05-21 DIAGNOSIS — M81.0 AGE-RELATED OSTEOPOROSIS W/OUT CURRENT PATHOLOGICAL FRACTURE: ICD-10-CM

## 2024-05-21 PROCEDURE — 99214 OFFICE O/P EST MOD 30 MIN: CPT

## 2024-05-21 PROCEDURE — G2211 COMPLEX E/M VISIT ADD ON: CPT

## 2024-05-21 RX ORDER — GABAPENTIN 300 MG/1
300 CAPSULE ORAL
Qty: 30 | Refills: 0 | Status: DISCONTINUED | COMMUNITY
Start: 2023-10-19 | End: 2024-05-21

## 2024-05-21 NOTE — HISTORY OF PRESENT ILLNESS
[FreeTextEntry1] : Osteoporosis diagnosed by DEXA scan 8-10 years ago Previous osteoporosis treatments: Reclast (x1; 9/2023) Calcium and Vitamin D through diet and supplementation: Physically active: Gardening. Last DEXA scan: August 2022; worst T-score of -3.2 in the left forearm, -1.0 in the femoral neck, -0.9 in the hip. Prior long-duration of glucocorticoid use: Frequent intraarticular glucocorticoid injections. History of fractures: Knee stress fractures Height loss: Yes, 2 inches approximately. Problems with balance or vision: Denies History of falls: Denies History of hyperparathyroidism: Denies History of hypercalcemia: Denies History of kidney stones: Denies History of malabsorption or eating disorder: Denies Prior anti-epileptic medications: Denies Prior chemotherapy or radiation therapy: Denies Caffeine, smoking, and alcohol history: Denies Prolonged amenorrhea or early menopause: Denies Family history of fractures, osteoporosis, or hyperparathyroidism: Mother, osteoporosis.  Heartburn or reflux symptoms: Denies ASCVD History: Denies Dentist following:  Yes, no extractions/implants planned.    09/02/2020- FOLLOW UP  CHARLI SCHULTZ is here to discuss test results , labs and further plan of care  labs discussed in detail- essentially unremarkable  no acute medical issues in the interim  dicussed the imgaing findings of the hip xray  blood work etc  on calcium and vt D      07/11/2023- FOLLOW UP  Patient presents for follow-up after almost 3 years. She was diagnosed with stress fractures in any history of multiple steroid shots for knee pain and osteoarthritis. She was also diagnosed with osteoporosis per DEXA scan on the most recent bone density. She did not she is to take alendronate as advised on prior visit. She takes 1000 international units of vitamin D. Stress fractures likely related to osteoporosis worsened with steroid use. At this point I would refer her to pain management as it is causing significant issues with her quality of life. In addition. I will start the work-up for osteoporosis treatment. I would prefer her to be treated with Forteo as it might help with fracture healing as well. Important to rule out any evidence of primary hyperparathyroidism or any calcium metabolic disorder. No history of cancer treatment or radiation treatment in the past. All questions and concerns addressed. Imaging reviewed. Follow-up in 4 to 6 months. Advised to start 1 Viactiv pill a day  05/21/2024- FOLLOW UP This is patient's first visit with me, Dr. Schaffer.  No fractures or falls in the interim.  She tolerated her first dose of Reclast in September 2023 with some flulike symptoms.  I discussed obtaining a new DEXA scan as a new baseline to gauge impact of therapy movements.  I recommend repeating thoracolumbar x-ray imaging to evaluate for compression fractures which would compel me to augment her treatment to anabolic therapy with either Evenity or Forteo/Tymlos.  Even Prolia would be better than Reclast for vertebral fractures.  If there are no fractures then recommend continuing annual Reclast infusions with annual DEXA scans to gauge improvement from therapy. Discussed possible adverse effects of osteoporosis therapy, including hypocalcemia, injection site reaction, musculoskeletal aches, osteonecrosis of the jaw and atypical femoral fractures. These complications do not occur often, rarely if at all. We will need routine labs drawn regularly just to ensure there is no hypocalcemia or vitamin D deficiency. I recommend regular dental evaluations to ensure that neither extractions nor implants are needed, so that jawbone health is maintained. We discussed that Reclast infusion may cause flu-type reaction and other injections may cause injection-site reaction. Evenity has a black box warning prohibiting use in patients who has cardiovascular or cerebrovascular events within last year. I discussed how the bone density gained from injection therapy can recede if injections are delayed, so it is important to not skip injections. We discussed reducing risk of AFF by limiting duration of consecutive treatments to less than 5-7 years. We may decide to initiate a drug holiday once annual DEXA surveillance has demonstrated resolution of osteoporosis. It would require continued annual surveillance of bone density to assess for bone loss. If discontinuing injection therapy, bisphosphonate therapy should be initiated within appropriate time to prevent rapid bone density loss. If she should ever experience hip or groin pain during treatment, she should tell me so I can investigate for AFF with hip x-rays. If she should endure a fragility fracture during treatment, we would have to consider an alternative therapy. Discussed supportive management by ingesting 1820-6712 mg elemental calcium and 2000 unit vitamin D daily through diet and supplementation. Encouraged weight-bearing exercises and avoidance of prolonged bedrest. Routine osteoporosis labs are needed every 6 months.  Patient will return to clinic in 6 months. All questions and concerns were fully addressed to patient's satisfaction. Patient is in agreement with stated plan.

## 2024-05-21 NOTE — REASON FOR VISIT
[Follow - Up] : a follow-up visit [Osteoporosis] : osteoporosis [Source: ______] : History obtained from BREANNE [Family Member] : family member [Ad Hoc ] : provided by an ad hoc  [Interpreters_FullName] : Wendi [Interpreters_Relationshiptopatient] : Daughter [FreeTextEntry2] : Dr. Wade [TWNoteComboBox1] : [place]

## 2024-05-21 NOTE — PHYSICAL EXAM
[Alert] : alert [Well Nourished] : well nourished [Healthy Appearance] : healthy appearance [No Acute Distress] : no acute distress [Well Developed] : well developed [Normal Voice/Communication] : normal voice communication [Normal Sclera/Conjunctiva] : normal sclera/conjunctiva [EOMI] : extra ocular movement intact [Normal Hearing] : hearing was normal [No Respiratory Distress] : no respiratory distress [Normal Rate and Effort] : normal respiratory rate and effort [Normal Rate] : heart rate was normal [No Spinal Tenderness] : no spinal tenderness [Spine Straight] : spine straight [Kyphosis] : no kyphosis present [No Stigmata of Cushings Syndrome] : no stigmata of Cushings Syndrome [Normal Gait] : normal gait [Normal Strength/Tone] : muscle strength and tone were normal [No Motor Deficits] : the motor exam was normal [No Tremors] : no tremors [Oriented x3] : oriented to person, place, and time [Normal Affect] : the affect was normal [Recent Memory Normal] : recent memory was not impaired [Normal Insight/Judgement] : insight and judgment were intact [Normal Mood] : the mood was normal [Remote Memory Normal] : remote memory was not impaired

## 2024-05-23 PROBLEM — C50.112 MALIGNANT NEOPLASM OF CENTRAL PORTION OF LEFT BREAST IN FEMALE, ESTROGEN RECEPTOR POSITIVE: Status: ACTIVE | Noted: 2023-11-09

## 2024-05-23 NOTE — HISTORY OF PRESENT ILLNESS
[FreeTextEntry1] : Mrs. Frost is a 78-year-old female, diagnosed with stage IA, [id number], ER/CA+ invasive ductal carcinoma of the left breast, s/p lumpectomy and status post adjuvant radiation therapy. She is present for her one month follow-up status post radiation.   Mrs. Frost underwent a screening mammogram (23) which demonstrated asymmetry and distortion in the left breast. Follow-up diagnostic mammogram and US (10/20/23) revealed highly suspicious mass in the left 3:00 retroareolar region. On 11/3/23, she underwent a left ultrasound-guided core biopsy of the mass and pathology revealed a moderately differentiated invasive ductal carcinoma measuring 11 mm, grade 2, ER/CA positive HER2 (equivocal) negative with a Ki-67 of 41 to 50%.   On 23, she underwent a left partial mastectomy and sentinel lymph node sampling performed by Dr. Khan. Surgical pathology revealed a 12 mm invasive carcinoma, grade 2, DCIS present - intermediate grade; all surgical margins were negative - closest was 1.25 mm to the medial and at least 1.5 mm to the lateral margins. 0 / 2 lymph nodes were positive. Biomarkers were ER/CA positive, HER2 equivocal - negative, and Ki-67 41%. Ms. Frost tolerated the procedure well.  MammaPrint low risk luminal - type A  Patient has no family history of cancer.  GYN HX; , menarche 15, menopause at 57. No HRT or BC use.   Ms. Frost completed adjuvant radiation therapy to the left breast to a total dose of 26 Gy in 5 fractions on 24. She had very mild erythema to the treated area, which has resolved. She will have follow-up by Dr. Jimenez and Dr. Khan on 3/22/24. She is taking Tamoxifen. Overall, she is doing well.

## 2024-05-23 NOTE — DISEASE MANAGEMENT
[Pathological] : TNM Stage: p [IA] : IA [FreeTextEntry4] : Invasive Ductal Carcinoma of the left breast, ER/NE+ [TTNM] : 1c [NTNM] : 0 [MTNM] : X

## 2024-08-14 ENCOUNTER — APPOINTMENT (OUTPATIENT)
Dept: CARDIOLOGY | Facility: CLINIC | Age: 79
End: 2024-08-14

## 2024-09-04 ENCOUNTER — APPOINTMENT (OUTPATIENT)
Dept: CARDIOLOGY | Facility: CLINIC | Age: 79
End: 2024-09-04

## 2024-09-04 PROCEDURE — 93306 TTE W/DOPPLER COMPLETE: CPT

## 2024-09-05 ENCOUNTER — RESULT REVIEW (OUTPATIENT)
Age: 79
End: 2024-09-05

## 2024-09-16 ENCOUNTER — APPOINTMENT (OUTPATIENT)
Dept: CARDIOLOGY | Facility: CLINIC | Age: 79
End: 2024-09-16

## 2024-09-23 ENCOUNTER — RESULT REVIEW (OUTPATIENT)
Age: 79
End: 2024-09-23

## 2024-09-23 ENCOUNTER — APPOINTMENT (OUTPATIENT)
Dept: BREAST CENTER | Facility: CLINIC | Age: 79
End: 2024-09-23
Payer: MEDICARE

## 2024-09-23 ENCOUNTER — APPOINTMENT (OUTPATIENT)
Dept: HEMATOLOGY ONCOLOGY | Facility: CLINIC | Age: 79
End: 2024-09-23
Payer: MEDICARE

## 2024-09-23 VITALS
HEART RATE: 70 BPM | SYSTOLIC BLOOD PRESSURE: 137 MMHG | WEIGHT: 155 LBS | BODY MASS INDEX: 30.43 KG/M2 | HEIGHT: 60 IN | TEMPERATURE: 97.9 F | RESPIRATION RATE: 16 BRPM | OXYGEN SATURATION: 99 % | DIASTOLIC BLOOD PRESSURE: 77 MMHG

## 2024-09-23 VITALS — BODY MASS INDEX: 31.02 KG/M2 | OXYGEN SATURATION: 96 % | HEART RATE: 76 BPM | WEIGHT: 158 LBS | HEIGHT: 60 IN

## 2024-09-23 DIAGNOSIS — R25.2 CRAMP AND SPASM: ICD-10-CM

## 2024-09-23 DIAGNOSIS — M81.0 AGE-RELATED OSTEOPOROSIS W/OUT CURRENT PATHOLOGICAL FRACTURE: ICD-10-CM

## 2024-09-23 DIAGNOSIS — R53.83 OTHER FATIGUE: ICD-10-CM

## 2024-09-23 DIAGNOSIS — Z90.12 ACQUIRED ABSENCE OF LEFT BREAST AND NIPPLE: ICD-10-CM

## 2024-09-23 DIAGNOSIS — M25.571 PAIN IN RIGHT ANKLE AND JOINTS OF RIGHT FOOT: ICD-10-CM

## 2024-09-23 DIAGNOSIS — Z17.0 MALIGNANT NEOPLASM OF CENTRAL PORTION OF LEFT FEMALE BREAST: ICD-10-CM

## 2024-09-23 DIAGNOSIS — Z85.3 PERSONAL HISTORY OF MALIGNANT NEOPLASM OF BREAST: ICD-10-CM

## 2024-09-23 DIAGNOSIS — C50.112 MALIGNANT NEOPLASM OF CENTRAL PORTION OF LEFT FEMALE BREAST: ICD-10-CM

## 2024-09-23 PROCEDURE — 99213 OFFICE O/P EST LOW 20 MIN: CPT

## 2024-09-23 PROCEDURE — 99214 OFFICE O/P EST MOD 30 MIN: CPT

## 2024-09-23 PROCEDURE — 36415 COLL VENOUS BLD VENIPUNCTURE: CPT

## 2024-09-23 RX ORDER — MULTIVITAMIN
TABLET ORAL
Refills: 0 | Status: ACTIVE | COMMUNITY

## 2024-09-23 NOTE — HISTORY OF PRESENT ILLNESS
[de-identified] : Ms. Frost is a 78-year-old postmenopausal that presents for initial consultation referred by Dr. Khan for newly diagnosed breast cancer.   Oncological History:   Had benign breast bx at age 57 or R breast   s/p screening mammogram which showed in the left outer central breast and focal asymmetry and distortion.   Follow-up left ultrasound showed at the 3 o'clock position of 1.1 cm mass with normal-appearing lymph nodes.   s/p left ultrasound-guided core biopsy of the mass which was at 3:00 retroareolar region and showed a moderately differentiated invasive ductal carcinoma that is ER/[place] positive HER2 negative with a Ki-67 of 40 to 50%.    left partial mastectomy with sentinel node biopsy Moderately differentiated invasive ductal carcinoma, 12 mm, ER/[place] positive, HER2 negative, Ki-67 41%, 0/2 nodes [id number]  Mammaprint low risk   Breast Cancer Risk Factors: Menarche: 14 Date of LMP: 57 Menopause: 57  Age at first live birth: 25 Nursed: yes Hysterectomy: no Oophorectomy: no OCP: no HRT: no Last pap/pelvic exam: has not seen GYN in many years Related family history no cancer hx  Ashkenazi: no BRCA testing:no  Cardio Dr. Cosme s/p pericardiocentesis  Endo Dr. Pretty [facility] reclast   [de-identified] : Pt here for follow up Overall feels well US/Mammo on 09/05/24- reviewed with Dr. Kelerman Pt complains of intermittent bilateral leg cramps for a while, 3-4x/day Swelling right ankle swelling since June, states it has not gotten any worse, currently 5-6/10 on pain scale. does take advil when needed

## 2024-09-23 NOTE — ASSESSMENT
[Reviewed updated] : Reviewed updated [Designated Health Care Proxy] : Designated Health Care Proxy [Name: ___] : Name: [unfilled] [Relationship: ___] : Relationship: [unfilled] [Full Code] : full code [FreeTextEntry1] : # Moderately differentiated invasive ductal carcinoma, 12 mm, ER/PA positive, HER2 negative, Ki-67 41%, 0/2 nodes [id number] s/p left partial mastectomy with sentinel node biopsy Mammaprint low risk  Due to low risk mammaprint does not need chemo s/p RT She has osteoporosis of the forearm -3.2 and significant Knee pain that is affecting her quality of life I discussed AI however in her case she may benefit from Tamoxifen instead. 4/22/24 - recommend continuing tamoxifen. vs and labs reviewed. cbc and cmet wnl. pending vit D. continue ca++ and vit D.  9/23/24 -  vs and labs reviewed. wbc, hgb and plts wnl. kidney function, liver function and electrolytes wnl. 9/5/24 - No mammographic or sonographic evidence of malignancy. repeat 9/25. remains on tamoxifen but having side effects including fatigue, leg cramps and R ankle swelling. recommend hiatus for ~4  weeks and if improvement she is considering stopping. if no improvement would recommend resuming. xray of ankle neg for fracture, US neg for DVT. repeat DEXA 9/24. - Left forearm -3.1, Femoral neck -1.9, total hip-0.5, continue reclast Reviewed Dr. Khan's note 9/23/24  #osteoporosis reclast due today continue ca++ and vit D continue weight bearing exercise limit alcohol maintain healthy BMI  repeat DEXA 9/24. - Left forearm -3.1, Femoral neck -1.9, total hip-0.5, follows with Dr. Wilde  #R ankle pain  #fatigue/ leg cramps add on irons and b12  RTC in 4-6 months with cbc with diff, cmp, vit D, iron, ferritin, b12 [AdvancecareDate] : 09/23/24

## 2024-09-23 NOTE — CONSULT LETTER
[Dear  ___] : Dear  [unfilled], [Consult Letter:] : I had the pleasure of evaluating your patient, [unfilled]. [Please see my note below.] : Please see my note below. [Consult Closing:] : Thank you very much for allowing me to participate in the care of this patient.  If you have any questions, please do not hesitate to contact me. [Sincerely,] : Sincerely, [FreeTextEntry3] : Yane Jimenez DO, FACLORAINE, FACP Medical Oncology and Hematology [facility]

## 2024-09-23 NOTE — HISTORY OF PRESENT ILLNESS
[de-identified] : Ms. Frost is a 78-year-old postmenopausal that presents for initial consultation referred by Dr. Khan for newly diagnosed breast cancer.   Oncological History:   Had benign breast bx at age 57 or R breast   s/p screening mammogram which showed in the left outer central breast and focal asymmetry and distortion.   Follow-up left ultrasound showed at the 3 o'clock position of 1.1 cm mass with normal-appearing lymph nodes.   s/p left ultrasound-guided core biopsy of the mass which was at 3:00 retroareolar region and showed a moderately differentiated invasive ductal carcinoma that is ER/OK positive HER2 negative with a Ki-67 of 40 to 50%.    left partial mastectomy with sentinel node biopsy Moderately differentiated invasive ductal carcinoma, 12 mm, ER/OK positive, HER2 negative, Ki-67 41%, 0/2 nodes [id number]  Mammaprint low risk   Breast Cancer Risk Factors: Menarche: 14 Date of LMP: 57 Menopause: 57  Age at first live birth: 25 Nursed: yes Hysterectomy: no Oophorectomy: no OCP: no HRT: no Last pap/pelvic exam: has not seen GYN in many years Related family history no cancer hx  Ashkenazi: no BRCA testing:no  Cardio Dr. Cosme s/p pericardiocentesis  Endo Dr. Pretty [facility] reclast   [de-identified] : Pt here for follow up Overall feels well US/Mammo on 09/05/24- reviewed with Dr. Kelerman Pt complains of intermittent bilateral leg cramps for a while, 3-4x/day Swelling right ankle swelling since June, states it has not gotten any worse, currently 5-6/10 on pain scale. does take advil when needed

## 2024-09-23 NOTE — REVIEW OF SYSTEMS
[Negative] : Allergic/Immunologic [Fatigue] : fatigue [Lower Ext Edema] : lower extremity edema [Joint Pain] : joint pain [Hot Flashes] : hot flashes [FreeTextEntry5] : right ankle swelling [FreeTextEntry7] : diarrhea last week but has since resolved [de-identified] : hot flashes almost daily

## 2024-09-23 NOTE — CONSULT LETTER
[Dear  ___] : Dear  [unfilled], [Consult Letter:] : I had the pleasure of evaluating your patient, [unfilled]. [Please see my note below.] : Please see my note below. [Consult Closing:] : Thank you very much for allowing me to participate in the care of this patient.  If you have any questions, please do not hesitate to contact me. [Sincerely,] : Sincerely, [FreeTextEntry3] : Yane Jimenez DO, FACLORAINE, FACP Medical Oncology and Hematology [facility]

## 2024-09-23 NOTE — BEGINNING OF VISIT
[0] : 2) Feeling down, depressed, or hopeless: Not at all (0) [PHQ-2 Negative] : PHQ-2 Negative [Pain Scale: ___] : On a scale of 1-10, today the patient's pain is a(n) [unfilled]. [Never] : Never [Date Discussed (MM/DD/YY): ___] : Discussed: [unfilled] [Reviewed updated] : Reviewed updated [[id number]] : 0 [Future Reassessment of Pain Scale] : Future reassessment of pain scale

## 2024-09-23 NOTE — ASSESSMENT
[Reviewed updated] : Reviewed updated [Designated Health Care Proxy] : Designated Health Care Proxy [Name: ___] : Name: [unfilled] [Relationship: ___] : Relationship: [unfilled] [Full Code] : full code [FreeTextEntry1] : # Moderately differentiated invasive ductal carcinoma, 12 mm, ER/[place] positive, HER2 negative, Ki-67 41%, 0/2 nodes [id number] s/p left partial mastectomy with sentinel node biopsy Mammaprint low risk  Due to low risk mammaprint does not need chemo s/p RT She has osteoporosis of the forearm -3.2 and significant Knee pain that is affecting her quality of life I discussed AI however in her case she may benefit from Tamoxifen instead. 4/22/24 - recommend continuing tamoxifen. vs and labs reviewed. cbc and cmet wnl. pending vit D. continue ca++ and vit D.  9/23/24 -  vs and labs reviewed. wbc, hgb and plts wnl. kidney function, liver function and electrolytes wnl. 9/5/24 - No mammographic or sonographic evidence of malignancy. repeat 9/25. remains on tamoxifen but having side effects including fatigue, leg cramps and R ankle swelling. recommend hiatus for ~4  weeks and if improvement she is considering stopping. if no improvement would recommend resuming. xray of ankle neg for fracture, US neg for DVT. repeat DEXA 9/24. - Left forearm -3.1, Femoral neck -1.9, total hip-0.5, continue reclast Reviewed Dr. Khan's note 9/23/24  #osteoporosis reclast due today continue ca++ and vit D continue weight bearing exercise limit alcohol maintain healthy BMI  repeat DEXA 9/24. - Left forearm -3.1, Femoral neck -1.9, total hip-0.5, follows with Dr. Wilde  #R ankle pain  #fatigue/ leg cramps add on irons and b12  RTC in 4-6 months with cbc with diff, cmp, vit D, iron, ferritin, b12 [AdvancecareDate] : 09/23/24

## 2024-09-23 NOTE — BEGINNING OF VISIT
[0] : 2) Feeling down, depressed, or hopeless: Not at all (0) [PHQ-2 Negative] : PHQ-2 Negative [Pain Scale: ___] : On a scale of 1-10, today the patient's pain is a(n) [unfilled]. [Never] : Never [Date Discussed (MM/DD/YY): ___] : Discussed: [unfilled] [Reviewed updated] : Reviewed updated [[id number]] : 0 [Future Reassessment of Pain Scale] : Future reassessment of pain scale

## 2024-09-23 NOTE — REVIEW OF SYSTEMS
[Negative] : Allergic/Immunologic [Fatigue] : fatigue [Lower Ext Edema] : lower extremity edema [Joint Pain] : joint pain [Hot Flashes] : hot flashes [FreeTextEntry5] : right ankle swelling [FreeTextEntry7] : diarrhea last week but has since resolved [de-identified] : hot flashes almost daily

## 2024-09-23 NOTE — HISTORY OF PRESENT ILLNESS
[FreeTextEntry1] : 11/23 left partial mastectomy with sentinel node biopsy Moderately differentiated invasive ductal carcinoma, 12 mm, ER/HI positive, HER2 negative, Ki-67 41%, 0/2 nodes [id number], Stage 1; low risk luminal MammaPrint: F LE X trial Whole breast radiation, tamoxifen  Patient denies any breast masses or bone pain.  Recent mammogram and ultrasound were unremarkable, BI-RADS 2.  Patient is taking and tolerating her tamoxifen but does have some stress fractures of the knee with quite a bit of pain and needs knee replacements.  She has not seen her gynecologist yet, but her daughter tells me they are going to set that up as soon as possible.  78-year-old postmenopausal woman had a screening mammogram which showed in the left outer central breast and focal asymmetry and distortion.  Follow-up left ultrasound showed at the 3 o'clock position of 1.1 cm mass with normal-appearing lymph nodes.  Patient underwent a left ultrasound-guided core biopsy of the mass which was at 3:00 retroareolar region and showed a moderately differentiated invasive ductal carcinoma that is ER/HI positive HER2 negative with a Ki-67 of 40 to 50%.  Patient denies any breast masses or bone pain.  Patient has no family history of breast cancer and this is the patient's second biopsy the her first being at age 57 that was a benign right core biopsy.  Patient has never taken hormone replacement therapy.  Patient has a clinical stage Ia breast cancer that is T1 cN0 M0.

## 2024-09-23 NOTE — CONSULT LETTER
[Dear  ___] : Dear  [unfilled], [Consult Letter:] : I had the pleasure of evaluating your patient, [unfilled]. [Please see my note below.] : Please see my note below. [Consult Closing:] : Thank you very much for allowing me to participate in the care of this patient.  If you have any questions, please do not hesitate to contact me. [Sincerely,] : Sincerely, [FreeTextEntry3] : Yane Jimenez DO, FACLORAINE, FACP Medical Oncology and Hematology [facility]

## 2024-09-23 NOTE — PHYSICAL EXAM
[Fully active, able to carry on all pre-disease performance without restriction] : Status 0 - Fully active, able to carry on all pre-disease performance without restriction [Normal] : affect appropriate [de-identified] : deferred

## 2024-09-23 NOTE — ASSESSMENT
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 4/22/24 - recommend continuing tamoxifen. vs and labs reviewed. cbc and cmet wnl. pending vit D. continue ca++ and vit D.  9/23/24 -  vs and labs reviewed. wbc, hgb and plts wnl. kidney function, liver function and electrolytes wnl. 9/5/24 - No mammographic or sonographic evidence of malignancy. repeat 9/25. remains on tamoxifen but having side effects including fatigue, leg cramps and R ankle swelling. recommend hiatus for ~4  weeks and if improvement she is considering stopping. if no improvement would recommend resuming. xray of ankle neg for fracture, US neg for DVT. repeat DEXA 9/24. - Left forearm -3.1, Femoral neck -1.9, total hip-0.5, continue reclast Reviewed Dr. Khan's note 9/23/24  #osteoporosis reclast due today continue ca++ and vit D continue weight bearing exercise limit alcohol maintain healthy BMI  repeat DEXA 9/24. - Left forearm -3.1, Femoral neck -1.9, total hip-0.5, follows with Dr. Wilde  #R ankle pain  #fatigue/ leg cramps add on irons and b12  RTC in 4-6 months with cbc with diff, cmp, vit D, iron, ferritin, b12 [AdvancecareDate] : 09/23/24

## 2024-09-23 NOTE — BEGINNING OF VISIT
[0] : 2) Feeling down, depressed, or hopeless: Not at all (0) [PHQ-2 Negative] : PHQ-2 Negative [Pain Scale: ___] : On a scale of 1-10, today the patient's pain is a(n) [unfilled]. [Never] : Never [Date Discussed (MM/DD/YY): ___] : Discussed: [unfilled] [Reviewed updated] : Reviewed updated [[id number]] : 0 [Future Reassessment of Pain Scale] : Future reassessment of pain scale

## 2024-09-23 NOTE — PHYSICAL EXAM
[Fully active, able to carry on all pre-disease performance without restriction] : Status 0 - Fully active, able to carry on all pre-disease performance without restriction [Normal] : affect appropriate [de-identified] : deferred

## 2024-09-23 NOTE — BEGINNING OF VISIT
[0] : 2) Feeling down, depressed, or hopeless: Not at all (0) [PHQ-2 Negative] : PHQ-2 Negative [Pain Scale: ___] : On a scale of 1-10, today the patient's pain is a(n) [unfilled]. [Never] : Never [Date Discussed (MM/DD/YY): ___] : Discussed: [unfilled] [Reviewed updated] : Reviewed updated [[id number]] : 0 [Future Reassessment of Pain Scale] : Future reassessment of pain scale

## 2024-09-23 NOTE — REVIEW OF SYSTEMS
[Negative] : Allergic/Immunologic [Fatigue] : fatigue [Lower Ext Edema] : lower extremity edema [Joint Pain] : joint pain [Hot Flashes] : hot flashes [FreeTextEntry5] : right ankle swelling [FreeTextEntry7] : diarrhea last week but has since resolved [de-identified] : hot flashes almost daily

## 2024-09-23 NOTE — HISTORY OF PRESENT ILLNESS
[de-identified] : Ms. Frost is a 78-year-old postmenopausal that presents for initial consultation referred by Dr. Khan for newly diagnosed breast cancer.   Oncological History:   Had benign breast bx at age 57 or R breast   s/p screening mammogram which showed in the left outer central breast and focal asymmetry and distortion.   Follow-up left ultrasound showed at the 3 o'clock position of 1.1 cm mass with normal-appearing lymph nodes.   s/p left ultrasound-guided core biopsy of the mass which was at 3:00 retroareolar region and showed a moderately differentiated invasive ductal carcinoma that is ER/MI positive HER2 negative with a Ki-67 of 40 to 50%.    left partial mastectomy with sentinel node biopsy Moderately differentiated invasive ductal carcinoma, 12 mm, ER/MI positive, HER2 negative, Ki-67 41%, 0/2 nodes [id number]  Mammaprint low risk   Breast Cancer Risk Factors: Menarche: 14 Date of LMP: 57 Menopause: 57  Age at first live birth: 25 Nursed: yes Hysterectomy: no Oophorectomy: no OCP: no HRT: no Last pap/pelvic exam: has not seen GYN in many years Related family history no cancer hx  Ashkenazi: no BRCA testing:no  Cardio Dr. Cosme s/p pericardiocentesis  Endo Dr. Pretty [facility] reclast   [de-identified] : Pt here for follow up Overall feels well US/Mammo on 09/05/24- reviewed with Dr. Kelerman Pt complains of intermittent bilateral leg cramps for a while, 3-4x/day Swelling right ankle swelling since June, states it has not gotten any worse, currently 5-6/10 on pain scale. does take advil when needed

## 2024-09-23 NOTE — ASSESSMENT
[Reviewed updated] : Reviewed updated [Designated Health Care Proxy] : Designated Health Care Proxy [Name: ___] : Name: [unfilled] [Relationship: ___] : Relationship: [unfilled] [Full Code] : full code [FreeTextEntry1] : # Moderately differentiated invasive ductal carcinoma, 12 mm, ER/LA positive, HER2 negative, Ki-67 41%, 0/2 nodes [id number] s/p left partial mastectomy with sentinel node biopsy Mammaprint low risk  Due to low risk mammaprint does not need chemo s/p RT She has osteoporosis of the forearm -3.2 and significant Knee pain that is affecting her quality of life I discussed AI however in her case she may benefit from Tamoxifen instead. 4/22/24 - recommend continuing tamoxifen. vs and labs reviewed. cbc and cmet wnl. pending vit D. continue ca++ and vit D.  9/23/24 -  vs and labs reviewed. wbc, hgb and plts wnl. kidney function, liver function and electrolytes wnl. 9/5/24 - No mammographic or sonographic evidence of malignancy. repeat 9/25. remains on tamoxifen but having side effects including fatigue, leg cramps and R ankle swelling. recommend hiatus for ~4  weeks and if improvement she is considering stopping. if no improvement would recommend resuming. xray of ankle neg for fracture, US neg for DVT. repeat DEXA 9/24. - Left forearm -3.1, Femoral neck -1.9, total hip-0.5, continue reclast Reviewed Dr. Khan's note 9/23/24  #osteoporosis reclast due today continue ca++ and vit D continue weight bearing exercise limit alcohol maintain healthy BMI  repeat DEXA 9/24. - Left forearm -3.1, Femoral neck -1.9, total hip-0.5, follows with Dr. Wilde  #R ankle pain  #fatigue/ leg cramps add on irons and b12  RTC in 4-6 months with cbc with diff, cmp, vit D, iron, ferritin, b12 [AdvancecareDate] : 09/23/24

## 2024-09-23 NOTE — REVIEW OF SYSTEMS
[Negative] : Allergic/Immunologic [Fatigue] : fatigue [Lower Ext Edema] : lower extremity edema [Joint Pain] : joint pain [Hot Flashes] : hot flashes [FreeTextEntry5] : right ankle swelling [FreeTextEntry7] : diarrhea last week but has since resolved [de-identified] : hot flashes almost daily

## 2024-09-23 NOTE — PHYSICAL EXAM
[Fully active, able to carry on all pre-disease performance without restriction] : Status 0 - Fully active, able to carry on all pre-disease performance without restriction [Normal] : affect appropriate [de-identified] : deferred

## 2024-09-23 NOTE — PHYSICAL EXAM
[Fully active, able to carry on all pre-disease performance without restriction] : Status 0 - Fully active, able to carry on all pre-disease performance without restriction [Normal] : affect appropriate [de-identified] : deferred

## 2024-09-23 NOTE — CONSULT LETTER
[Dear  ___] : Dear  [unfilled], [Consult Letter:] : I had the pleasure of evaluating your patient, [unfilled]. [Please see my note below.] : Please see my note below. [Consult Closing:] : Thank you very much for allowing me to participate in the care of this patient.  If you have any questions, please do not hesitate to contact me. [Sincerely,] : Sincerely, [FreeTextEntry3] : Yane Jimenez DO, FACLORAINE, FACP Medical Oncology and Hematology [facility]

## 2024-09-23 NOTE — HISTORY OF PRESENT ILLNESS
[de-identified] : Ms. Frost is a 78-year-old postmenopausal that presents for initial consultation referred by Dr. Khan for newly diagnosed breast cancer.   Oncological History:   Had benign breast bx at age 57 or R breast   s/p screening mammogram which showed in the left outer central breast and focal asymmetry and distortion.   Follow-up left ultrasound showed at the 3 o'clock position of 1.1 cm mass with normal-appearing lymph nodes.   s/p left ultrasound-guided core biopsy of the mass which was at 3:00 retroareolar region and showed a moderately differentiated invasive ductal carcinoma that is ER/[place] positive HER2 negative with a Ki-67 of 40 to 50%.    left partial mastectomy with sentinel node biopsy Moderately differentiated invasive ductal carcinoma, 12 mm, ER/[place] positive, HER2 negative, Ki-67 41%, 0/2 nodes [id number]  Mammaprint low risk   Breast Cancer Risk Factors: Menarche: 14 Date of LMP: 57 Menopause: 57  Age at first live birth: 25 Nursed: yes Hysterectomy: no Oophorectomy: no OCP: no HRT: no Last pap/pelvic exam: has not seen GYN in many years Related family history no cancer hx  Ashkenazi: no BRCA testing:no  Cardio Dr. Cosme s/p pericardiocentesis  Endo Dr. Pretty [facility] reclast   [de-identified] : Pt here for follow up Overall feels well US/Mammo on 09/05/24- reviewed with Dr. Kelerman Pt complains of intermittent bilateral leg cramps for a while, 3-4x/day Swelling right ankle swelling since June, states it has not gotten any worse, currently 5-6/10 on pain scale. does take advil when needed

## 2024-10-25 ENCOUNTER — APPOINTMENT (OUTPATIENT)
Dept: HEART AND VASCULAR | Facility: CLINIC | Age: 79
End: 2024-10-25
Payer: MEDICARE

## 2024-10-25 ENCOUNTER — NON-APPOINTMENT (OUTPATIENT)
Age: 79
End: 2024-10-25

## 2024-10-25 DIAGNOSIS — I83.90 ASYMPTOMATIC VARICOSE VEINS OF UNSPECIFIED LOWER EXTREMITY: ICD-10-CM

## 2024-10-25 DIAGNOSIS — I31.39 OTHER PERICARDIAL EFFUSION (NONINFLAMMATORY): ICD-10-CM

## 2024-10-25 DIAGNOSIS — I87.2 VENOUS INSUFFICIENCY (CHRONIC) (PERIPHERAL): ICD-10-CM

## 2024-10-25 DIAGNOSIS — I10 ESSENTIAL (PRIMARY) HYPERTENSION: ICD-10-CM

## 2024-10-25 DIAGNOSIS — I77.810 THORACIC AORTIC ECTASIA: ICD-10-CM

## 2024-10-25 DIAGNOSIS — E78.5 HYPERLIPIDEMIA, UNSPECIFIED: ICD-10-CM

## 2024-10-25 DIAGNOSIS — E66.9 OBESITY, UNSPECIFIED: ICD-10-CM

## 2024-10-25 DIAGNOSIS — R73.03 PREDIABETES.: ICD-10-CM

## 2024-10-25 PROCEDURE — G2211 COMPLEX E/M VISIT ADD ON: CPT

## 2024-10-25 PROCEDURE — 93000 ELECTROCARDIOGRAM COMPLETE: CPT

## 2024-10-25 PROCEDURE — 99215 OFFICE O/P EST HI 40 MIN: CPT

## 2024-11-13 ENCOUNTER — RESULT REVIEW (OUTPATIENT)
Age: 79
End: 2024-11-13

## 2025-03-24 ENCOUNTER — APPOINTMENT (OUTPATIENT)
Dept: HEMATOLOGY ONCOLOGY | Facility: CLINIC | Age: 80
End: 2025-03-24

## 2025-04-03 ENCOUNTER — NON-APPOINTMENT (OUTPATIENT)
Age: 80
End: 2025-04-03

## 2025-04-04 ENCOUNTER — NON-APPOINTMENT (OUTPATIENT)
Age: 80
End: 2025-04-04

## 2025-04-04 ENCOUNTER — APPOINTMENT (OUTPATIENT)
Dept: HEART AND VASCULAR | Facility: CLINIC | Age: 80
End: 2025-04-04

## 2025-04-04 VITALS
WEIGHT: 152 LBS | HEART RATE: 70 BPM | OXYGEN SATURATION: 97 % | DIASTOLIC BLOOD PRESSURE: 70 MMHG | BODY MASS INDEX: 29.69 KG/M2 | SYSTOLIC BLOOD PRESSURE: 120 MMHG

## 2025-04-04 DIAGNOSIS — Z01.810 ENCOUNTER FOR PREPROCEDURAL CARDIOVASCULAR EXAMINATION: ICD-10-CM

## 2025-04-04 DIAGNOSIS — I77.810 THORACIC AORTIC ECTASIA: ICD-10-CM

## 2025-04-04 DIAGNOSIS — I87.2 VENOUS INSUFFICIENCY (CHRONIC) (PERIPHERAL): ICD-10-CM

## 2025-04-04 DIAGNOSIS — I10 ESSENTIAL (PRIMARY) HYPERTENSION: ICD-10-CM

## 2025-04-04 DIAGNOSIS — I31.39 OTHER PERICARDIAL EFFUSION (NONINFLAMMATORY): ICD-10-CM

## 2025-04-04 DIAGNOSIS — E78.5 HYPERLIPIDEMIA, UNSPECIFIED: ICD-10-CM

## 2025-04-04 PROCEDURE — 93000 ELECTROCARDIOGRAM COMPLETE: CPT

## 2025-04-04 PROCEDURE — G2211 COMPLEX E/M VISIT ADD ON: CPT

## 2025-04-04 PROCEDURE — 99214 OFFICE O/P EST MOD 30 MIN: CPT

## 2025-05-06 ENCOUNTER — APPOINTMENT (OUTPATIENT)
Dept: HEART AND VASCULAR | Facility: CLINIC | Age: 80
End: 2025-05-06

## 2025-06-02 ENCOUNTER — RX RENEWAL (OUTPATIENT)
Age: 80
End: 2025-06-02

## 2025-06-23 ENCOUNTER — APPOINTMENT (OUTPATIENT)
Dept: BREAST CENTER | Facility: CLINIC | Age: 80
End: 2025-06-23
Payer: MEDICARE

## 2025-06-23 VITALS
HEIGHT: 62 IN | TEMPERATURE: 98 F | WEIGHT: 152 LBS | HEART RATE: 65 BPM | BODY MASS INDEX: 27.97 KG/M2 | DIASTOLIC BLOOD PRESSURE: 82 MMHG | SYSTOLIC BLOOD PRESSURE: 138 MMHG

## 2025-06-23 VITALS — WEIGHT: 152 LBS | OXYGEN SATURATION: 97 % | BODY MASS INDEX: 27.97 KG/M2 | HEIGHT: 62 IN | HEART RATE: 71 BPM

## 2025-06-23 PROBLEM — R92.8 ABNORMAL FINDING ON BREAST IMAGING: Status: ACTIVE | Noted: 2025-06-23

## 2025-06-23 PROBLEM — R59.0 AXILLARY ADENOPATHY: Status: ACTIVE | Noted: 2025-06-23

## 2025-06-23 PROCEDURE — 99214 OFFICE O/P EST MOD 30 MIN: CPT

## 2025-06-30 ENCOUNTER — RESULT REVIEW (OUTPATIENT)
Age: 80
End: 2025-06-30

## 2025-09-08 DIAGNOSIS — M81.0 AGE-RELATED OSTEOPOROSIS W/OUT CURRENT PATHOLOGICAL FRACTURE: ICD-10-CM
